# Patient Record
Sex: MALE | Race: BLACK OR AFRICAN AMERICAN | NOT HISPANIC OR LATINO | Employment: FULL TIME | URBAN - METROPOLITAN AREA
[De-identification: names, ages, dates, MRNs, and addresses within clinical notes are randomized per-mention and may not be internally consistent; named-entity substitution may affect disease eponyms.]

---

## 2022-02-26 ENCOUNTER — PATIENT MESSAGE (OUTPATIENT)
Dept: ADMINISTRATIVE | Facility: OTHER | Age: 27
End: 2022-02-26

## 2022-03-23 ENCOUNTER — OFFICE VISIT (OUTPATIENT)
Dept: PRIMARY CARE CLINIC | Facility: CLINIC | Age: 27
End: 2022-03-23
Payer: COMMERCIAL

## 2022-03-23 VITALS
HEIGHT: 70 IN | OXYGEN SATURATION: 98 % | BODY MASS INDEX: 39.3 KG/M2 | WEIGHT: 274.5 LBS | TEMPERATURE: 98 F | DIASTOLIC BLOOD PRESSURE: 82 MMHG | RESPIRATION RATE: 16 BRPM | SYSTOLIC BLOOD PRESSURE: 124 MMHG | HEART RATE: 59 BPM

## 2022-03-23 DIAGNOSIS — Z82.41 FAMILY HISTORY OF SUDDEN CARDIAC DEATH IN BROTHER: ICD-10-CM

## 2022-03-23 DIAGNOSIS — R07.9 CHEST PAIN, UNSPECIFIED TYPE: ICD-10-CM

## 2022-03-23 DIAGNOSIS — Z23 NEED FOR VACCINATION: ICD-10-CM

## 2022-03-23 DIAGNOSIS — Z00.00 ANNUAL PHYSICAL EXAM: ICD-10-CM

## 2022-03-23 DIAGNOSIS — J30.2 SEASONAL ALLERGIES: ICD-10-CM

## 2022-03-23 DIAGNOSIS — F32.1 CURRENT MODERATE EPISODE OF MAJOR DEPRESSIVE DISORDER WITHOUT PRIOR EPISODE: ICD-10-CM

## 2022-03-23 DIAGNOSIS — G44.209 TENSION HEADACHE: ICD-10-CM

## 2022-03-23 DIAGNOSIS — F41.1 GAD (GENERALIZED ANXIETY DISORDER): ICD-10-CM

## 2022-03-23 DIAGNOSIS — Z11.59 NEED FOR HEPATITIS C SCREENING TEST: ICD-10-CM

## 2022-03-23 DIAGNOSIS — F41.0 PANIC DISORDER: ICD-10-CM

## 2022-03-23 DIAGNOSIS — Z11.4 ENCOUNTER FOR SCREENING FOR HIV: ICD-10-CM

## 2022-03-23 DIAGNOSIS — Z76.89 ENCOUNTER TO ESTABLISH CARE: Primary | ICD-10-CM

## 2022-03-23 PROCEDURE — 93010 ELECTROCARDIOGRAM REPORT: CPT | Mod: S$GLB,,, | Performed by: INTERNAL MEDICINE

## 2022-03-23 PROCEDURE — 99999 PR PBB SHADOW E&M-EST. PATIENT-LVL IV: ICD-10-PCS | Mod: PBBFAC,,, | Performed by: STUDENT IN AN ORGANIZED HEALTH CARE EDUCATION/TRAINING PROGRAM

## 2022-03-23 PROCEDURE — 99999 PR PBB SHADOW E&M-EST. PATIENT-LVL IV: CPT | Mod: PBBFAC,,, | Performed by: STUDENT IN AN ORGANIZED HEALTH CARE EDUCATION/TRAINING PROGRAM

## 2022-03-23 PROCEDURE — 93005 ELECTROCARDIOGRAM TRACING: CPT | Mod: S$GLB,,, | Performed by: STUDENT IN AN ORGANIZED HEALTH CARE EDUCATION/TRAINING PROGRAM

## 2022-03-23 PROCEDURE — 90471 IMMUNIZATION ADMIN: CPT | Mod: S$GLB,,, | Performed by: STUDENT IN AN ORGANIZED HEALTH CARE EDUCATION/TRAINING PROGRAM

## 2022-03-23 PROCEDURE — 93010 EKG 12-LEAD: ICD-10-PCS | Mod: S$GLB,,, | Performed by: INTERNAL MEDICINE

## 2022-03-23 PROCEDURE — 90715 TDAP VACCINE 7 YRS/> IM: CPT | Mod: S$GLB,,, | Performed by: STUDENT IN AN ORGANIZED HEALTH CARE EDUCATION/TRAINING PROGRAM

## 2022-03-23 PROCEDURE — 93005 EKG 12-LEAD: ICD-10-PCS | Mod: S$GLB,,, | Performed by: STUDENT IN AN ORGANIZED HEALTH CARE EDUCATION/TRAINING PROGRAM

## 2022-03-23 PROCEDURE — 90715 TDAP VACCINE GREATER THAN OR EQUAL TO 7YO IM: ICD-10-PCS | Mod: S$GLB,,, | Performed by: STUDENT IN AN ORGANIZED HEALTH CARE EDUCATION/TRAINING PROGRAM

## 2022-03-23 PROCEDURE — 90471 TDAP VACCINE GREATER THAN OR EQUAL TO 7YO IM: ICD-10-PCS | Mod: S$GLB,,, | Performed by: STUDENT IN AN ORGANIZED HEALTH CARE EDUCATION/TRAINING PROGRAM

## 2022-03-23 PROCEDURE — 99204 OFFICE O/P NEW MOD 45 MIN: CPT | Mod: 25,S$GLB,, | Performed by: STUDENT IN AN ORGANIZED HEALTH CARE EDUCATION/TRAINING PROGRAM

## 2022-03-23 PROCEDURE — 99204 PR OFFICE/OUTPT VISIT, NEW, LEVL IV, 45-59 MIN: ICD-10-PCS | Mod: 25,S$GLB,, | Performed by: STUDENT IN AN ORGANIZED HEALTH CARE EDUCATION/TRAINING PROGRAM

## 2022-03-23 RX ORDER — FLUTICASONE PROPIONATE 50 MCG
2 SPRAY, SUSPENSION (ML) NASAL DAILY
Qty: 15.8 ML | Refills: 5 | Status: SHIPPED | OUTPATIENT
Start: 2022-03-23 | End: 2022-07-02 | Stop reason: SDUPTHER

## 2022-03-23 RX ORDER — SERTRALINE HYDROCHLORIDE 100 MG/1
100 TABLET, FILM COATED ORAL DAILY
Qty: 30 TABLET | Refills: 11 | Status: SHIPPED | OUTPATIENT
Start: 2022-03-23 | End: 2023-03-23

## 2022-03-23 RX ORDER — HYDROXYZINE HYDROCHLORIDE 25 MG/1
25-50 TABLET, FILM COATED ORAL 3 TIMES DAILY PRN
Qty: 60 TABLET | Refills: 1 | Status: SHIPPED | OUTPATIENT
Start: 2022-03-23

## 2022-03-23 NOTE — PROGRESS NOTES
Verified pt ID using name and . NKDA. Administered Tdap vaccine IM in Left deltoid per physician order using aseptic technique. Aspirated and no blood return noted. Pt tolerated well with no adverse reactions noted.

## 2022-03-23 NOTE — PROGRESS NOTES
Subjective:       Patient ID: Jose Perez is a 26 y.o. male.    Chief Complaint: Establish Care, Headache, and Chest Pain      HPI:  26 y.o. male presents to [facility] to establish care    Acute concerns?: Chest pain and headaches    Patient reports chest pain that has been present since at least 2019, was couging up blood at that time,  Reports was streaking in sputum. Wasn't tested for TB at that time. Can be burning or tightening. Some days ar worse than others. Can get sweats associated. Waxes and wanes in frequency and intensity. Did have work-up for concerns in 2019. Not worse when physically active. Random times occurs. Feels like symptoms could be associated with anxiety. 1 unexplaind death in family. Was young at the time. Feels was associated with grief from death of dad. Was a teenager. No known HOCM in family.    Patient reports headaches have always been present. Has been worse since beginning of 2022 and feels that it is related to stress and anxiety with sick mother. Has never kept headache diary    Little interest or pleasure in doing things: Several days  Feeling down, depressed, or hopeless: Nearly every day  Trouble falling or staying asleep, or sleeping too much: Several days  Feeling tired or having little energy: Several days  Poor appetite or overeating: More than half the days  Feeling bad about yourself - or that you are a failure or have let yourself or your family down: More than half the days  Trouble concentrating on things, such as reading the newspaper or watching television: Several days  Moving or speaking so slowly that other people could have noticed. Or the opposite - being so fidgety or restless that you have been moving around a lot more than usual: More than half the days  Thoughts that you would be better off dead, or of hurting yourself in some way: More than half the days  PHQ-9 Total Score: 15  If you checked off any problems, how difficult have these problems made it  for you to do your work, take care of things at home, or get along with other people?: Somewhat difficult  PHQ-9 Total Score: 15    GAD7 3/23/2022   1. Feeling nervous, anxious, or on edge? 1   2. Not being able to stop or control worrying? 3   3. Worrying too much about different things? 2   4. Trouble relaxing? 2   5. Being so restless that it is hard to sit still? 3   6. Becoming easily annoyed or irritable? 3   7. Feeling afraid as if something awful might happen? 3   8. If you checked off any problems, how difficult have these problems made it for you to do your work, take care of things at home, or get along with other people? 1   CHRISTINA-7 Score 17     No regular exercise at this time  Not interested in therapist at this time      Onset?: See above  Location?: Bilateral frontal  Quality?: Aching, thumping, feels like hit in head  Frequency?: Daily  Duration?: comes and goes throughout day, worse at work  Aura (explaned to patient)?: Rare blurred vision  Light/sound sensitivity?: No  Neurologic symptoms (weakness, numbness, dysarthria)?: None  Recurrent?: Yes  Wake from sleep?: No  Severity?: 10/10 at worst  Unilateral tearing/rhinorrhea?: No  Interventions?: Tylenol alleve, some relief on alleve. Aleve does help.    No manic symptoms when explained.    Last PCP?: [facility]  Allergies: NKDA  Medical History: seasonal allergies  Medications: Allegra PRN, ibuprofen 600 mg prn  Surgical History: None  Family History: Mother: cervical cancer, maternal aunt breast cancer, maternal aunt lung cancer; no known autoimmune disease  Social History: [employer] in past, occasional EtOH, not regular use, no illicits    Fasting?: Yes  Hep C screening?: Amenable  HIV screening?: Amenable  Last tetanuis vaccine?: some time    Review of Systems   Constitutional: Negative for chills, diaphoresis, fatigue and fever.   HENT: Positive for congestion, rhinorrhea and sneezing. Negative for sinus pressure and sore throat.    Eyes:  "Positive for discharge and itching.   Respiratory: Negative for cough and shortness of breath.    Cardiovascular: Positive for chest pain. Negative for palpitations.   Gastrointestinal: Negative for abdominal pain, diarrhea, nausea and vomiting.   Skin: Negative for rash and wound.   Neurological: Positive for headaches. Negative for dizziness and weakness.   Psychiatric/Behavioral: Positive for dysphoric mood, hallucinations (auditory, jibberish, calling his name. No command voices. Will see shadows) and suicidal ideas (Wouldn't act on. Was worse 1 year ago). Negative for self-injury and sleep disturbance. The patient is nervous/anxious.        Objective:      Vitals:    03/23/22 1108   BP: 124/82   BP Location: Left arm   Patient Position: Sitting   BP Method: Large (Manual)   Pulse: (!) 59   Resp: 16   Temp: 98.1 °F (36.7 °C)   TempSrc: Oral   SpO2: 98%   Weight: 124.5 kg (274 lb 7.6 oz)   Height: 5' 10" (1.778 m)     Physical Exam  Vitals reviewed.   Constitutional:       General: He is not in acute distress.     Appearance: Normal appearance. He is not ill-appearing.   HENT:      Head: Normocephalic and atraumatic.   Eyes:      General:         Right eye: No discharge.         Left eye: No discharge.      Conjunctiva/sclera: Conjunctivae normal.   Neck:      Thyroid: No thyroid mass, thyromegaly or thyroid tenderness.   Cardiovascular:      Rate and Rhythm: Normal rate and regular rhythm.      Pulses: Normal pulses.      Heart sounds: Normal heart sounds.   Pulmonary:      Effort: Pulmonary effort is normal.      Breath sounds: Normal breath sounds.   Musculoskeletal:         General: No deformity.   Skin:     General: Skin is warm and dry.      Coloration: Skin is not jaundiced or pale.   Neurological:      General: No focal deficit present.      Mental Status: He is alert and oriented to person, place, and time.   Psychiatric:         Mood and Affect: Mood normal.         Behavior: Behavior normal.         " Thought Content: Thought content normal.             No results found for: NA, K, CL, CO2, BUN, CREATININE, GLUCOSE, ANIONGAP  No results found for: HGBA1C  No results found for: BNP, BNPTRIAGEBLO    No results found for: WBC, HGB, HCT, PLT, GRAN  No results found for: CHOL, HDL, LDLCALC, TRIG       Current Outpatient Medications:     fluticasone propionate (FLONASE) 50 mcg/actuation nasal spray, 2 sprays (100 mcg total) by Each Nostril route once daily., Disp: 15.8 mL, Rfl: 5    hydrOXYzine HCL (ATARAX) 25 MG tablet, Take 1-2 tablets (25-50 mg total) by mouth 3 (three) times daily as needed for Anxiety., Disp: 60 tablet, Rfl: 1    sertraline (ZOLOFT) 100 MG tablet, Take 1 tablet (100 mg total) by mouth once daily. Take 1/2 tablet (50 mg) first 14 days when starting medication, then one tablet (100 mg) daily thereafter, Disp: 30 tablet, Rfl: 11        Assessment:       1. Encounter to establish care    2. Annual physical exam    3. Current moderate episode of major depressive disorder without prior episode    4. CHRISTINA (generalized anxiety disorder)    5. Panic disorder    6. Tension headache    7. Chest pain, unspecified type    8. Family history of sudden cardiac death in brother    9. Seasonal allergies    10. Need for hepatitis C screening test    11. Encounter for screening for HIV    12. Need for vaccination           Plan:       Encounter to establish care  Annual physical exam  -     CBC Auto Differential; Future; Expected date: 03/23/2022  -     Comprehensive Metabolic Panel; Future; Expected date: 03/23/2022    Current moderate episode of major depressive disorder without prior episode  CHRISTINA (generalized anxiety disorder)  Panic disorder  Tension headache  -     sertraline (ZOLOFT) 100 MG tablet; Take 1 tablet (100 mg total) by mouth once daily. Take 1/2 tablet (50 mg) first 14 days when starting medication, then one tablet (100 mg) daily thereafter  Dispense: 30 tablet; Refill: 11  -     hydrOXYzine HCL  (ATARAX) 25 MG tablet; Take 1-2 tablets (25-50 mg total) by mouth 3 (three) times daily as needed for Anxiety.  Dispense: 60 tablet; Refill: 1  -     TSH; Future; Expected date: 03/23/2022  -     Medication side effect profile described today's visit, will discontinue if sexual side effects develop  - Suspect contribution of anxiety to current tension headaches, continue ibuprofen PRN, headache diary provided today.  - Conservative measures discussed today. Recommend routine exercise, daily meditation, and breathing exercises.    Chest pain, unspecified type  Family history of sudden cardiac death in brother  -     EKG 12-lead  -     Echo; Future  - With sudden unexplained death in family member will order echocardiogram  - Negative cardiac work-up 2019, will follow up symptoms in 4 weeks on sertraline    Seasonal allergies  -     fluticasone propionate (FLONASE) 50 mcg/actuation nasal spray; 2 sprays (100 mcg total) by Each Nostril route once daily.  Dispense: 15.8 mL; Refill: 5  - Instructed patient on daily use recommended    Need for hepatitis C screening test  -     Hepatitis C Antibody; Future; Expected date: 03/23/2022    Encounter for screening for HIV  -     HIV 1/2 Ag/Ab (4th Gen); Future; Expected date: 03/23/2022    Need for vaccination  -     (In Office Administered) Tdap Vaccine    RTC in 4 weeks for anxiety/depression monitoring

## 2022-04-20 ENCOUNTER — OFFICE VISIT (OUTPATIENT)
Dept: PRIMARY CARE CLINIC | Facility: CLINIC | Age: 27
End: 2022-04-20
Payer: COMMERCIAL

## 2022-04-20 VITALS
HEART RATE: 110 BPM | SYSTOLIC BLOOD PRESSURE: 120 MMHG | HEIGHT: 70 IN | WEIGHT: 266 LBS | DIASTOLIC BLOOD PRESSURE: 82 MMHG | OXYGEN SATURATION: 99 % | BODY MASS INDEX: 38.08 KG/M2 | RESPIRATION RATE: 18 BRPM

## 2022-04-20 DIAGNOSIS — G44.209 TENSION HEADACHE: ICD-10-CM

## 2022-04-20 DIAGNOSIS — F41.1 GAD (GENERALIZED ANXIETY DISORDER): ICD-10-CM

## 2022-04-20 DIAGNOSIS — F41.0 PANIC DISORDER: ICD-10-CM

## 2022-04-20 DIAGNOSIS — J02.9 PHARYNGITIS, UNSPECIFIED ETIOLOGY: Primary | ICD-10-CM

## 2022-04-20 LAB
CTP QC/QA: YES
SARS-COV-2 RDRP RESP QL NAA+PROBE: NEGATIVE

## 2022-04-20 PROCEDURE — U0002: ICD-10-PCS | Mod: QW,S$GLB,, | Performed by: STUDENT IN AN ORGANIZED HEALTH CARE EDUCATION/TRAINING PROGRAM

## 2022-04-20 PROCEDURE — U0002 COVID-19 LAB TEST NON-CDC: HCPCS | Mod: QW,S$GLB,, | Performed by: STUDENT IN AN ORGANIZED HEALTH CARE EDUCATION/TRAINING PROGRAM

## 2022-04-20 PROCEDURE — 99214 OFFICE O/P EST MOD 30 MIN: CPT | Mod: S$GLB,,, | Performed by: STUDENT IN AN ORGANIZED HEALTH CARE EDUCATION/TRAINING PROGRAM

## 2022-04-20 PROCEDURE — 99999 PR PBB SHADOW E&M-EST. PATIENT-LVL IV: CPT | Mod: PBBFAC,,, | Performed by: STUDENT IN AN ORGANIZED HEALTH CARE EDUCATION/TRAINING PROGRAM

## 2022-04-20 PROCEDURE — 99999 PR PBB SHADOW E&M-EST. PATIENT-LVL IV: ICD-10-PCS | Mod: PBBFAC,,, | Performed by: STUDENT IN AN ORGANIZED HEALTH CARE EDUCATION/TRAINING PROGRAM

## 2022-04-20 PROCEDURE — 99214 PR OFFICE/OUTPT VISIT, EST, LEVL IV, 30-39 MIN: ICD-10-PCS | Mod: S$GLB,,, | Performed by: STUDENT IN AN ORGANIZED HEALTH CARE EDUCATION/TRAINING PROGRAM

## 2022-04-20 NOTE — PROGRESS NOTES
"Subjective:       Patient ID: Jose Perez is a 26 y.o. male.    Chief Complaint: Follow-up      HPI:  26 y.o. male presents to [facility] for follow-up visit on sertraline    Patient reports that he recently began having symptom of sore throat that began this morning. No other symptoms.    Patient reports that he is tolerating sertraline fine at this time. After first dose felt like he was having hives, resolved after.    Hasn't been having headaches as frequently.      Review of Systems   Constitutional: Negative for chills, diaphoresis, fatigue and fever.   HENT: Positive for sore throat. Negative for congestion, sinus pressure, sneezing and trouble swallowing.    Respiratory: Negative for cough and shortness of breath.    Cardiovascular: Negative for chest pain and palpitations.   Gastrointestinal: Negative for abdominal pain, diarrhea, nausea and vomiting.   Musculoskeletal: Positive for arthralgias (left shoulder, since old football injury, comes and goes, does not desire intervention at this time). Negative for joint swelling and myalgias.   Skin: Negative for rash and wound.   Neurological: Negative for dizziness, weakness, numbness and headaches.       Objective:      Vitals:    04/20/22 1023   BP: 120/82   BP Location: Right arm   Patient Position: Sitting   BP Method: Medium (Manual)   Pulse: 110   Resp: 18   SpO2: 99%   Weight: 120.7 kg (265 lb 15.8 oz)   Height: 5' 10" (1.778 m)     Physical Exam  Vitals reviewed.   Constitutional:       General: He is not in acute distress.     Appearance: Normal appearance. He is not ill-appearing.   HENT:      Head: Normocephalic and atraumatic.      Mouth/Throat:      Mouth: Mucous membranes are moist.      Pharynx: Oropharynx is clear. Posterior oropharyngeal erythema present. No oropharyngeal exudate.   Eyes:      General:         Right eye: No discharge.         Left eye: No discharge.      Conjunctiva/sclera: Conjunctivae normal.   Cardiovascular:      Rate and " Rhythm: Normal rate.   Pulmonary:      Effort: Pulmonary effort is normal.   Musculoskeletal:         General: No deformity.      Cervical back: Neck supple. No tenderness.   Lymphadenopathy:      Cervical: Cervical adenopathy (anterior cervical 2 soft mobile lymph nodes left side) present.   Skin:     Coloration: Skin is not jaundiced or pale.   Neurological:      General: No focal deficit present.      Mental Status: He is alert and oriented to person, place, and time.   Psychiatric:         Mood and Affect: Mood normal.         Behavior: Behavior normal.             Lab Results   Component Value Date     03/23/2022    K 4.1 03/23/2022     03/23/2022    CO2 24 03/23/2022    BUN 7 03/23/2022    CREATININE 1.0 03/23/2022    ANIONGAP 11 03/23/2022     No results found for: HGBA1C  No results found for: BNP, BNPTRIAGEBLO    Lab Results   Component Value Date    WBC 4.91 03/23/2022    HGB 15.4 03/23/2022    HCT 48.3 03/23/2022     03/23/2022    GRAN 2.3 03/23/2022    GRAN 46.8 03/23/2022     No results found for: CHOL, HDL, LDLCALC, TRIG       Current Outpatient Medications:     fluticasone propionate (FLONASE) 50 mcg/actuation nasal spray, 2 sprays (100 mcg total) by Each Nostril route once daily., Disp: 15.8 mL, Rfl: 5    hydrOXYzine HCL (ATARAX) 25 MG tablet, Take 1-2 tablets (25-50 mg total) by mouth 3 (three) times daily as needed for Anxiety., Disp: 60 tablet, Rfl: 1    sertraline (ZOLOFT) 100 MG tablet, Take 1 tablet (100 mg total) by mouth once daily. Take 1/2 tablet (50 mg) first 14 days when starting medication, then one tablet (100 mg) daily thereafter, Disp: 30 tablet, Rfl: 11        Assessment:       1. Pharyngitis, unspecified etiology    2. CHRISTINA (generalized anxiety disorder)    3. Panic disorder    4. Tension headache           Plan:       Pharyngitis, unspecified etiology  -     POCT COVID-19 Rapid Screening  - Present to ED if severely fatigued or respiratory distress develops    - Conservative care measures provided, contact clinic if symptoms not improving within 7 days    CHRISTINA (generalized anxiety disorder)  Panic disorder  Tension headache  -     Continue sertraline, informed patient best to continue medication for 1 year before decision to discontinue as less likely to relapse    Follow-up in 5 months

## 2022-06-03 ENCOUNTER — PATIENT MESSAGE (OUTPATIENT)
Dept: PRIMARY CARE CLINIC | Facility: CLINIC | Age: 27
End: 2022-06-03
Payer: COMMERCIAL

## 2022-06-03 ENCOUNTER — TELEPHONE (OUTPATIENT)
Dept: PRIMARY CARE CLINIC | Facility: CLINIC | Age: 27
End: 2022-06-03
Payer: COMMERCIAL

## 2022-06-03 DIAGNOSIS — R06.2 WHEEZING: Primary | ICD-10-CM

## 2022-06-03 RX ORDER — ALBUTEROL SULFATE 90 UG/1
2 AEROSOL, METERED RESPIRATORY (INHALATION) EVERY 6 HOURS PRN
Qty: 18 G | Refills: 0 | Status: SHIPPED | OUTPATIENT
Start: 2022-06-03 | End: 2023-06-03

## 2022-06-03 NOTE — TELEPHONE ENCOUNTER
----- Message from Edwin Seo MD sent at 6/3/2022 12:49 PM CDT -----  Please inform patient albuterol has been sent and schedule appointment for wheezing.

## 2022-06-03 NOTE — TELEPHONE ENCOUNTER
Spoke to patient and notified him of rx of albuterol was sent to his pharmacy . Patient stated that he will call back to schedule his appt. He needs to check his work schedule.

## 2022-09-16 ENCOUNTER — PATIENT OUTREACH (OUTPATIENT)
Dept: ADMINISTRATIVE | Facility: HOSPITAL | Age: 27
End: 2022-09-16
Payer: COMMERCIAL

## 2022-09-16 NOTE — PROGRESS NOTES
Health Maintenance Due   Topic Date Due    Lipid Panel  Never done    COVID-19 Vaccine (3 - Booster for Pfizer series) 03/30/2022    Influenza Vaccine (1) 09/01/2022     Chart review done. HM updated. Immunizations reviewed & updated. Care Everywhere updated.

## 2022-09-27 ENCOUNTER — PATIENT MESSAGE (OUTPATIENT)
Dept: PRIMARY CARE CLINIC | Facility: CLINIC | Age: 27
End: 2022-09-27
Payer: COMMERCIAL

## 2024-03-15 ENCOUNTER — PATIENT OUTREACH (OUTPATIENT)
Dept: ADMINISTRATIVE | Facility: HOSPITAL | Age: 29
End: 2024-03-15

## 2024-03-15 NOTE — PROGRESS NOTES
[facility] panel list reviewed. Per care everywhere, patient established with a new PCP on 2/29/2024, LESTER [address] Dr. removed as PCP

## 2024-09-19 ENCOUNTER — PATIENT MESSAGE (OUTPATIENT)
Dept: PRIMARY CARE CLINIC | Facility: CLINIC | Age: 29
End: 2024-09-19